# Patient Record
Sex: FEMALE | Race: OTHER | HISPANIC OR LATINO | URBAN - METROPOLITAN AREA
[De-identification: names, ages, dates, MRNs, and addresses within clinical notes are randomized per-mention and may not be internally consistent; named-entity substitution may affect disease eponyms.]

---

## 2022-03-16 ENCOUNTER — TELEPHONE (OUTPATIENT)
Dept: GASTROENTEROLOGY | Facility: CLINIC | Age: 62
End: 2022-03-16

## 2022-03-16 NOTE — TELEPHONE ENCOUNTER
----- Message from Kyra Escobar sent at 3/16/2022 12:03 PM CDT -----  Type:  Sooner Apoointment Request    Caller is requesting a sooner appointment.  Caller declined first available appointment listed below.  Caller will not accept being placed on the waitlist and is requesting a message be sent to doctor.  Name of Caller:Anjelica ZAPATA Festus  When is the first available appointment?5/4  Symptoms:rectal issues  Would the patient rather a call back or a response [facility]? call  Best Call Back Number:[phone]   Additional Information: n/a

## 2022-11-14 ENCOUNTER — TELEPHONE (OUTPATIENT)
Dept: GASTROENTEROLOGY | Facility: CLINIC | Age: 62
End: 2022-11-14
Payer: COMMERCIAL

## 2022-11-14 NOTE — TELEPHONE ENCOUNTER
Talked with patient; she had a bowel movement this am and had a red balloon shaped protrusion come out of her rectum. She states she has not had any bleeding or pain, but is having difficulty walking because of the size. She needs advise as to what to do.

## 2022-11-14 NOTE — TELEPHONE ENCOUNTER
----- Message from Laurita Rust sent at 11/14/2022 12:06 PM CST -----  Regarding: pt advice  Contact: pt  Type:  Needs Medical Advice    Who Called:  Anjelica Mortensen  Symptoms (please be specific):  red balloon shape protruding from anus    How long has patient had these symptoms:   today   Pharmacy name and phone #:  [facility] [address] [place]  [phone]  Would the patient rather a call back or a response via [facility]?  Call back   Best Call Back Number:  [phone]  Additional Information:

## 2022-11-15 NOTE — TELEPHONE ENCOUNTER
Add to Dr. Leon clinic schedule for tomorrow (Wednesday 11/16/2022) or Thursday 11/17/2022 afternoon.   MLC

## 2022-11-15 NOTE — TELEPHONE ENCOUNTER
Left VM message that Dr. Valverde would see her tomorrow or Thursday in clinic if she wishes. Instructed her to call us back and let us know what she wants to do.

## 2022-11-17 ENCOUNTER — TELEPHONE (OUTPATIENT)
Dept: GASTROENTEROLOGY | Facility: CLINIC | Age: 62
End: 2022-11-17
Payer: COMMERCIAL

## 2022-11-17 ENCOUNTER — TELEPHONE (OUTPATIENT)
Dept: SURGERY | Facility: CLINIC | Age: 62
End: 2022-11-17
Payer: COMMERCIAL

## 2022-11-17 NOTE — TELEPHONE ENCOUNTER
Spoke with patient regarding scheduling appointment with Dr. Portillo. Patient states that she feels that she may have rectal prolapse. Appointment scheduled and details confirmed verbally over phone. Reminder slip placed in mail. Patient verlbalizes understanding.

## 2022-11-17 NOTE — TELEPHONE ENCOUNTER
----- Message from Maggie Greenfield sent at 11/17/2022 10:00 AM CST -----  Contact: pt  Pt is trying to schedule an appt with provider 12/15 or 12/16 due to being in town already. She believes she may have rectal prolapse. She stated her tail bone also hurts often.     Confirmed contact below:  Contact Name:Anjelica Mortensen  Phone Number: [phone]

## 2022-11-17 NOTE — TELEPHONE ENCOUNTER
----- Message from Mani Cisse sent at 11/17/2022  9:18 AM CST -----  Contact: pt  .Type:  Patient Returning Call    Who Called:delio   Who Left Message for Patient:ankit  Does the patient know what this is regarding?:pt says she will not need appt with dr lira   Would the patient rather a call back or a response via [facility]?   Best Call Back Number:.[phone]    Additional Information:

## 2022-11-30 ENCOUNTER — TELEPHONE (OUTPATIENT)
Dept: SURGERY | Facility: CLINIC | Age: 62
End: 2022-11-30
Payer: COMMERCIAL

## 2022-12-01 ENCOUNTER — TELEPHONE (OUTPATIENT)
Dept: SURGERY | Facility: CLINIC | Age: 62
End: 2022-12-01
Payer: COMMERCIAL

## 2022-12-01 NOTE — TELEPHONE ENCOUNTER
"Spoke with patient regarding appointment on 12/16 that needs to be rescheduled. Pt stated, "she lives 3 hours away and was coming into town 12/15-12/16." Offered to see a different provider on 12/16. Pt declined, would like to be seen by Dr. Portillo. Inquired if patient would be able to come to clinic for 3:20 PM on 12/14. Pt verbally confirmed availability for appt.      ----- Message from Khalida José sent at 12/1/2022  4:28 PM CST -----  JEFF STROUD calling regarding Patient Advice for missed call from [place], call back [phone]    "

## 2022-12-02 ENCOUNTER — TELEPHONE (OUTPATIENT)
Dept: SURGERY | Facility: CLINIC | Age: 62
End: 2022-12-02
Payer: COMMERCIAL

## 2022-12-02 NOTE — TELEPHONE ENCOUNTER
Spoke with pt to inform her that Dr. Portillo's clinic will be available on 12/16 in the afternoon. Offered pt to keep 12/14 appt or move back to 12/16. Pt would like to move it back to 12/16. Will r/s at 2:20 PM on 12/16. Pt verbally confirmed appt time.

## 2022-12-15 NOTE — PROGRESS NOTES
CRS Office Visit History and Physical      SUBJECTIVE:     Chief Complaint: Rectal pressure    History of Present Illness:  The patient is new patient to this practice.   Course is as follows:  Patient is a 62 y.o. female presents with rectal pressure and difficulty having a complete BM.  Symptoms have been present for 2.5 years.  Has tried Pelvic Floor PT x 2 (June 2020 and summer 2022).  Did not notice significant improvement.  She also reports seeing a red bulge one time after a bowel movement, but only the one time and has not had to push anything back in.  Has seen a Gastro, tried Linzess (diarrhea and incontinence), MiraLax (stool too soft and frequent), and Metamucil (was told by her Gyn to stop).  Had a vaginal hysterectomy for similar symptoms in Sept 2021 (didn't help).  Has a daily BM in the morning.  Does not splint.  Feels like the only way she can easily empty is with using a glycerine suppository.    Prior colonoscopy: Yes - July 2021  Prior abdominal surgery: No  Prior pelvic or anorectal surgery: Yes - vag hyst  Family history of colon/rectal cancer: No  Family history of IBD: No  Steroid or other immunosuppression: No  Blood thinners: No  Current stool softeners or fiber supplement: No  Active smoking: No  Prior deliveries: Yes - 4  complicated by tear: Yes     [place] (FI):  Total: 0    Altomare (ODS):  How long on toilet:  >30min (4)  How many times/day:  3-4 (2)    Digitation:   2-3x/wk (3)   Laxatives:  2-3x/wk (3)   Enemas:    Never (0)   Incomplete stool:  Always (4)  Strain:    <75% (3)   Total: 19    Stool consistency/[place]: 2/3/4  Bowel movements per month:  Daily   Leakage of urine: No  Trouble emptying your bladder: No  Feel something bulging through vagina? Yes - unsure    Review of patient's allergies indicates:   Allergen Reactions    Azithromycin Other (See Comments)       No past medical history on file.  No past surgical history on file.  No family history on file.        Review  of Systems:  ROS    OBJECTIVE:     Vital Signs (Most Recent)  /70 (BP Location: Left arm, Patient Position: Sitting)   Pulse 63   Wt 64.5 kg (142 lb 4.8 oz)     Physical Exam:  General: Other female in no distress   Neuro: Alert and oriented x 4.  Moves all extremities.     HEENT: No icterus.  Trachea midline  Respiratory: Respirations are even and unlabored  Cardiac: Regular rate  Extremities: Warm dry and intact  Skin: No rashes    Examined in stirrups    Anorectal:   External exam: Perianal skin healthy, perineal body in tact, scar over posterior vaginal wall. No rectal prolapse with valsalva, no vaginal prolapse past the hymen with valsalva  Digital exam revealed decreased tone. Moderate squeeze with in tact external sphincter anteriorly, no large rectocele, delayed but appropriate relaxation with valsalva, no masses, no large enterocele    Patient was also taken to restroom and asked to sit on commode and strain.  No hemorrhoidal or rectal prolapse seen with this.    ASSESSMENT/PLAN:     63yo F w/ rectal pressure and incomplete evacuation    The patient was instructed to:  Increase water intake to at least 8-10 glasses of water per day.  Take a daily fiber supplement (Konsyl, Benefiber, Metamucil) and increase dietary intake to 20-30 grams/day.  Schedule fluoro defecography    Angie Portillo MD  Staff Surgeon, Colon and Rectal Surgery  [facility]

## 2022-12-16 ENCOUNTER — OFFICE VISIT (OUTPATIENT)
Dept: SURGERY | Facility: CLINIC | Age: 62
End: 2022-12-16
Attending: COLON & RECTAL SURGERY
Payer: COMMERCIAL

## 2022-12-16 VITALS — WEIGHT: 142.31 LBS | HEART RATE: 63 BPM | SYSTOLIC BLOOD PRESSURE: 118 MMHG | DIASTOLIC BLOOD PRESSURE: 70 MMHG

## 2022-12-16 DIAGNOSIS — R10.2 PELVIC PRESSURE IN FEMALE: Primary | ICD-10-CM

## 2022-12-16 PROCEDURE — 3078F DIAST BP <80 MM HG: CPT | Mod: CPTII,S$GLB,, | Performed by: COLON & RECTAL SURGERY

## 2022-12-16 PROCEDURE — 3074F PR MOST RECENT SYSTOLIC BLOOD PRESSURE < 130 MM HG: ICD-10-PCS | Mod: CPTII,S$GLB,, | Performed by: COLON & RECTAL SURGERY

## 2022-12-16 PROCEDURE — 99999 PR PBB SHADOW E&M-EST. PATIENT-LVL III: CPT | Mod: PBBFAC,,, | Performed by: COLON & RECTAL SURGERY

## 2022-12-16 PROCEDURE — 1159F PR MEDICATION LIST DOCUMENTED IN MEDICAL RECORD: ICD-10-PCS | Mod: CPTII,S$GLB,, | Performed by: COLON & RECTAL SURGERY

## 2022-12-16 PROCEDURE — 3074F SYST BP LT 130 MM HG: CPT | Mod: CPTII,S$GLB,, | Performed by: COLON & RECTAL SURGERY

## 2022-12-16 PROCEDURE — 99204 PR OFFICE/OUTPT VISIT, NEW, LEVL IV, 45-59 MIN: ICD-10-PCS | Mod: S$GLB,,, | Performed by: COLON & RECTAL SURGERY

## 2022-12-16 PROCEDURE — 3078F PR MOST RECENT DIASTOLIC BLOOD PRESSURE < 80 MM HG: ICD-10-PCS | Mod: CPTII,S$GLB,, | Performed by: COLON & RECTAL SURGERY

## 2022-12-16 PROCEDURE — 99999 PR PBB SHADOW E&M-EST. PATIENT-LVL III: ICD-10-PCS | Mod: PBBFAC,,, | Performed by: COLON & RECTAL SURGERY

## 2022-12-16 PROCEDURE — 1160F RVW MEDS BY RX/DR IN RCRD: CPT | Mod: CPTII,S$GLB,, | Performed by: COLON & RECTAL SURGERY

## 2022-12-16 PROCEDURE — 1160F PR REVIEW ALL MEDS BY PRESCRIBER/CLIN PHARMACIST DOCUMENTED: ICD-10-PCS | Mod: CPTII,S$GLB,, | Performed by: COLON & RECTAL SURGERY

## 2022-12-16 PROCEDURE — 99204 OFFICE O/P NEW MOD 45 MIN: CPT | Mod: S$GLB,,, | Performed by: COLON & RECTAL SURGERY

## 2022-12-16 PROCEDURE — 1159F MED LIST DOCD IN RCRD: CPT | Mod: CPTII,S$GLB,, | Performed by: COLON & RECTAL SURGERY

## 2022-12-16 NOTE — PATIENT INSTRUCTIONS
Start Benefiber   Stir 2 teaspoons of Benefiber into 4-8 oz of your favorite non-carbonated beverage or soft food (hot or cold). Stir well until dissolved. 2 teaspoons three times daily      What is a defecography?  Unlike a colonoscopy, you may not need to undergo a complete clean-out preparation. Some centers may simply ask you to use an enema before your appointment. You would then need to refrain from eating for at least two hours prior to the exam.    To perform the procedure, barium paste would be slowly injected into the rectum until full. Filling it to capacity stimulates the nerves to empty the bowel as it does under normal conditions.    You would then be asked to sit on a special toilet to evacuate the paste. You would be instructed to squeeze and strain as you expel the paste entirely or as much as you can, As this is happening, either a series of X-ray or an X-ray video will be taken.    The procedure takes approximately 15 to 30 minutes in total. While the exam may seem awkward and uncomfortable, it typically doesn't cause any pain.    In some cases, the doctor may request that you drink a barium solution an hour before the exam so that images of your small intestine can also be taken. In women, a small amount of barium paste may be smeared on the vagina to get a better image of the space between the vaginal wall and rectum.

## 2022-12-21 ENCOUNTER — TELEPHONE (OUTPATIENT)
Dept: SURGERY | Facility: CLINIC | Age: 62
End: 2022-12-21
Payer: COMMERCIAL

## 2022-12-21 NOTE — TELEPHONE ENCOUNTER
Request faxed over to [facility] for MRI images.     Phone [phone]  Fax [phone]

## 2022-12-28 ENCOUNTER — PATIENT MESSAGE (OUTPATIENT)
Dept: SURGERY | Facility: CLINIC | Age: 62
End: 2022-12-28
Payer: COMMERCIAL

## 2023-01-23 ENCOUNTER — TELEPHONE (OUTPATIENT)
Dept: SURGERY | Facility: CLINIC | Age: 63
End: 2023-01-23
Payer: COMMERCIAL

## 2023-01-23 NOTE — TELEPHONE ENCOUNTER
Talked to patient, she will be in town on 02/03 and would like to do defecogram then.     Called radiology- test scheduled & patient informed.     Also, asked her to please get MRI images from [facility] in [place] since we have been unsuccessful. Patient thinks she may have a copy with her that she could drop off next Friday.

## 2023-01-23 NOTE — TELEPHONE ENCOUNTER
----- Message from Angeles Geiger sent at 1/23/2023 11:47 AM CST -----  Contact: pt  Pt requesting call back RE: would like to schedule Fl Defecogram, nothing available in epic    Confirmed contact below:  Contact Name:Anjelica Dudleyowd  Phone Number: [phone]

## 2023-02-03 ENCOUNTER — HOSPITAL ENCOUNTER (OUTPATIENT)
Dept: RADIOLOGY | Facility: HOSPITAL | Age: 63
Discharge: HOME | End: 2023-02-03
Attending: COLON & RECTAL SURGERY
Payer: COMMERCIAL

## 2023-02-03 DIAGNOSIS — R10.2 PELVIC PRESSURE IN FEMALE: ICD-10-CM

## 2023-02-03 PROCEDURE — A9698 NON-RAD CONTRAST MATERIALNOC: HCPCS | Performed by: COLON & RECTAL SURGERY

## 2023-02-03 PROCEDURE — 25500020 PHARM REV CODE 255: Performed by: COLON & RECTAL SURGERY

## 2023-02-03 PROCEDURE — 74270 FL DEFECOGRAM: ICD-10-PCS | Mod: 26,,, | Performed by: RADIOLOGY

## 2023-02-03 PROCEDURE — 74270 X-RAY XM COLON 1CNTRST STD: CPT | Mod: 26,,, | Performed by: RADIOLOGY

## 2023-02-03 PROCEDURE — 74270 X-RAY XM COLON 1CNTRST STD: CPT | Mod: TC

## 2023-02-03 RX ADMIN — BARIUM SULFATE 684 G: 0.6 CREAM ORAL at 10:02

## 2023-02-03 RX ADMIN — BARIUM SULFATE 60 ML: 0.6 SUSPENSION ORAL at 10:02

## 2023-02-14 ENCOUNTER — TELEPHONE (OUTPATIENT)
Dept: SURGERY | Facility: CLINIC | Age: 63
End: 2023-02-14
Payer: COMMERCIAL

## 2023-02-14 NOTE — TELEPHONE ENCOUNTER
Called patient to provide her with our address so she can mail disc with MRI images. No answer, left a message.

## 2023-02-15 ENCOUNTER — TELEPHONE (OUTPATIENT)
Dept: SURGERY | Facility: CLINIC | Age: 63
End: 2023-02-15
Payer: COMMERCIAL

## 2023-02-15 NOTE — TELEPHONE ENCOUNTER
----- Message from Khalida José sent at 2/15/2023  2:53 PM CST -----  Anjelica Mortensen calling regarding Patient Advice for wanting to speak to the nurse Donnie for a missed call [phone]

## 2023-02-15 NOTE — TELEPHONE ENCOUNTER
Called patient back, provided clinic address so she can mail disc with MRI images. Will let her know when we receive it.

## 2024-10-30 ENCOUNTER — TELEPHONE (OUTPATIENT)
Dept: UROGYNECOLOGY | Facility: CLINIC | Age: 64
End: 2024-10-30
Payer: COMMERCIAL

## 2025-04-01 ENCOUNTER — TELEPHONE (OUTPATIENT)
Dept: SURGERY | Facility: CLINIC | Age: 65
End: 2025-04-01
Payer: COMMERCIAL

## 2025-04-01 NOTE — TELEPHONE ENCOUNTER
Placed call to screen pt for MDC candidacy.    Referring provider: Self referral [  Referral Dx: Rectal Prolapse   Additional Dx: Rectocele  Last seen bu Dr. Portillo in 12/22 for pelvic pain/ possible prolapse  Previously scheduled to see Dr. Francois [pt cancelled both appts]    LVM w/ reason for calling, clinic number w/ request for callback to perform screening.

## 2025-04-08 ENCOUNTER — TELEPHONE (OUTPATIENT)
Dept: SURGERY | Facility: CLINIC | Age: 65
End: 2025-04-08
Payer: COMMERCIAL

## 2025-04-08 NOTE — TELEPHONE ENCOUNTER
"Placed call to screen pt for MDC candidacy.    Referring provider: Self  CRS Appointment Dx: Rectal Prolapse  Additional Dx: Rectocele (?)    Patient complains of pelvic pressure and sensation of incomplete emptying.    1)  Urinary incontinence  --Do you have stress urinary leakage (leaking with cough, sneeze, exercise, physical activity, etc)?   occasionally    --Do you have urge urinary leakage (run to the bathroom but can't get there in time)?  Sometimes, but not a problem     --Which is worse? Equal  --Do you get frequent bladder infections/UTIs? (>3 in 1 year or 2 in 6 months per documented, positive urine culture): no   --When urinating, do you feel like you empty your bladder completely? yes     2)  Pelvic organ prolapse  --Do you notice something bulging from your vagina? unknown   --If so, how far does it come out? ---   --Do you have symptoms from the bulge? unknown   --If so, what are those symptoms? ---   --Are you having any bothersome vaginal discharge? no     --Do you notice something bulging from your rectum? yes - "1 time" Possibly following a bowel movement. Endorses increased pelvic pressure even after BM   --If so, can it be reduced? Yes  -- When does the rectal prolapse occur? Unsure, doesn't feel bulging tissue without having to look    3)  Bowel habits  -- Do you experience irregular bowel habits? Denies irregular bowel habits, just feeling of incomplete evacuation      Constipation: Denies  --Are you often constipated or have trouble passing stool? no   --Do you often have to strain with your bowel movements? no   --Is your stool consistency often hard/pellet-like? no   --When you have a bowel movement, do you feel like you empty completely? no    If not:                 - Do you ever have to push into the vaginal canal to fully empty? yes                   - Do you every have to push around the vagina/anus area to help evacuation? yes   --Do you have tissue bulging from the anus that stays " out? unknown     Diarrhea/Fecal Incontinence- Denies    -- Endorses daily Bms without the need to strain  -- Eats a healthy diet and exercises regularly      *Only ask if applicable*    4) Rectovaginal Fistulas  -- Do you ever experience the loss of stool or pass gas through the vaginal canal? no     5) Do you have a history of obstetrical tears or episiotomies during a vaginal delivery? Yes  -- If so, how many vaginal births? 4  -- Explain the degree of tear or episiotomy details. Experienced a tear with 2nd child        Patient identifies incomplete evacuation & pelvic pressure  as most bothersome.      Patient deemed not to be a candidate for MDC. Discussed with patient the benefit to scheduling appt w/ UroGyn provider based on s/s of rectocele based on her feedback.   Ms. Mortensen provided verbal approval to cancel CRS appt and requested to be scheduled w/ Dr. Francois in UroGyn.   RN to msg Dr. Francois's staff to assist w/ appt scheduling.     No additional needs identified upon completion of screening call.

## 2025-06-26 ENCOUNTER — OFFICE VISIT (OUTPATIENT)
Dept: UROGYNECOLOGY | Facility: CLINIC | Age: 65
End: 2025-06-26
Payer: COMMERCIAL

## 2025-06-26 ENCOUNTER — TELEPHONE (OUTPATIENT)
Dept: UROGYNECOLOGY | Facility: CLINIC | Age: 65
End: 2025-06-26
Payer: COMMERCIAL

## 2025-06-26 VITALS
HEART RATE: 70 BPM | BODY MASS INDEX: 23.69 KG/M2 | HEIGHT: 65 IN | DIASTOLIC BLOOD PRESSURE: 62 MMHG | SYSTOLIC BLOOD PRESSURE: 145 MMHG | WEIGHT: 142.19 LBS

## 2025-06-26 DIAGNOSIS — M79.18 MYOFASCIAL PAIN: ICD-10-CM

## 2025-06-26 DIAGNOSIS — M62.89 HIGH-TONE PELVIC FLOOR DYSFUNCTION: Primary | ICD-10-CM

## 2025-06-26 PROCEDURE — 99999 PR PBB SHADOW E&M-EST. PATIENT-LVL III: CPT | Mod: PBBFAC,,, | Performed by: OBSTETRICS & GYNECOLOGY

## 2025-06-26 RX ORDER — VALACYCLOVIR HYDROCHLORIDE 500 MG/1
500 TABLET, FILM COATED ORAL EVERY 12 HOURS
COMMUNITY
Start: 2025-01-10

## 2025-06-26 RX ORDER — ESTRADIOL 0.05 MG/D
1 PATCH TRANSDERMAL
COMMUNITY
Start: 2021-11-18

## 2025-06-26 NOTE — PROGRESS NOTES
Chief Complaint   Patient presents with    Vaginal Prolapse     Urinary incontinence        HPI: Patient is a 64 y.o. female  who presents today with concerns of rectal discomfort. States that symptoms started in 2020 when she began having pain/ discomfort in the tailbone area. Discomfort with sitting and the noticed her bowel movements changed. States several things have been done but there is still something wrong. She feels that she is not evacuating stool well. Symptoms are worse in the evening. Uses glycerin suppository which helps her empty. States she has a lot of discomfort. Mostly bothered by the poor or incomplete emptying.     In 2020 she had an X-ray and an MRI and was diagnosed with bilateral sacroilitis. May 2002 she had testing performed/ blood work to r/o arthritis/ cancer. Was seen at the [facility], Dr. Kai Sarmiento diagnosed her with coccydynia and referred her to PT with a pelvic specialist. In 2020 had a pelvic ultrasound and was diagnosed with pelvic congestion. Started PT with [facility] and did 6 visits from  through October. Had a sigmoidoscopy with Dr. Scot Valverde and underwent a nerve block injections at [facility]. Did not find it helpful. Had a CT scan in 2021 which was unremarkable. Ruled out ankylosing spondylolisthesis.She had dry needling 2021 for 4 sessions. In 2021 had a negative diagnostic laparoscopy. In 2021 had a steroid injection L4-L5 at the [facility] which did not help. Underwent the TLH/ BSO in 2021. In 2021 had a nuclear medicine CT scan and an MRI. Steroid injections were repeated 2022 and 2022. No change in symptoms. Did pelvic PT again at [facility] for 8 visits -2022. Saw Dr. Portillo and underwent a defecography with Dr. Portillo in 2023. Results of the defecography indicated mild residual contrast after evacuation otherwise was unremarkable with anorectal  angles within normal limits.     Started pilates 2/1/25 and she states that she feels so much better. She does ride a bike.     She denies constipation. States that she has regular bowel movements and they are soft but formed in consistency.     She denies a vaginal bulge but feels that something may be bulging from the rectal region. Thinks may have a hemorrhoid. At the end of the day she has to have a bowel movements and then uses a suppository.     Patient is sexual activity. She does endorse at times pain with intercourse.     She may occasionally have urgency incontinence if she waits too long. If she laughs too hard she may leak urine. But none of these occur on a regular basis.   She denies difficulty emptying her bladder. Denies issues of incomplete emptying. She reports that her urine stream is continuous and strong.   She denies needing to splint to urinate or have bowel movements.     She is drinking multiple (3) 16 ounces cups of water. May drink tea or milk.     Review of Systems   Constitutional:  Negative for activity change, appetite change, chills, fatigue, fever and unexpected weight change.   HENT:  Negative for nasal congestion, dental problem, hearing loss, mouth dryness and sore throat.    Eyes:  Negative for pain and eye dryness.   Respiratory:  Negative for cough, shortness of breath and wheezing.    Cardiovascular:  Negative for chest pain, palpitations and leg swelling.   Gastrointestinal:  Negative for abdominal distention, abdominal pain, blood in stool, constipation and rectal pain.   Endocrine: Negative for cold intolerance and heat intolerance.   Genitourinary:  Negative for dyspareunia, hot flashes and vaginal dryness.   Musculoskeletal:  Negative for arthralgias, back pain, gait problem, joint swelling, myalgias, neck pain and neck stiffness.   Integumentary:  Negative for rash.   Neurological:  Negative for dizziness, tremors, seizures, speech difficulty, weakness, light-headedness,  numbness and headaches.   Psychiatric/Behavioral:  Negative for confusion, dysphoric mood and sleep disturbance. The patient is not nervous/anxious.         Past Medical History:   Diagnosis Date    H/O total vaginal hysterectomy        Past Surgical History:   Procedure Laterality Date    HYSTERECTOMY, TOTAL, LAPAROSCOPIC, WITH SALPINGO-OOPHORECTOMY Bilateral 2021    NASAL SEPTUM SURGERY         Current Medications[1]    Review of patient's allergies indicates:   Allergen Reactions    Azithromycin Other (See Comments)    Erythromycin base        Family History   Problem Relation Name Age of Onset    Hypertension Mother 94     Stroke Mother 94     Thyroid disease Mother 94     No Known Problems Father 95     Breast cancer Sister         Social History     Socioeconomic History    Marital status: Unknown   Tobacco Use    Smoking status: Never    Smokeless tobacco: Never    Tobacco comments:     college   Substance and Sexual Activity    Alcohol use: Yes     Comment: occ    Drug use: Yes     Types: Marijuana    Sexual activity: Yes     Partners: Male   Social History Narrative    ** Merged History Encounter **     Lives with spouse of 36 years. Feels safe in her home.        OB History          4    Para   4    Term   4            AB        Living   4         SAB        IAB        Ectopic        Multiple        Live Births               Obstetric Comments   Smallest baby 7lbs 11oz rest were 8-9 pounds                Gyn History    Mammogram: none on file to review  Pap smear: s/p hysterectomy  LMP: No LMP recorded. Patient is postmenopausal.   Postmenopausal bleeding: n/a      INITIAL PHYSICAL EXAMINATION    Vitals:    25 1302   BP: (!) 145/62   Pulse: 70      General: Healthy in appearance, Well nourished, Affect Normal, NAD.  Neck: Supple, No masses, Trachea midline, Thyroid normal size,  non-tender, no masses or nodules.  Nodes: No clavicular/cervical adenopathy.  Skin: Normal  temperature, No atypical lesions or rash.  Heart: Normal sounds, no murmurs  Lungs: Normal respiratory effort.  Gastrointestinal: Non tender, Non distended, No masses, guarding or  rebound, No hepatosplenomegally, No hernia.  Ext: No clubbing, cyanosis, edema or varicosities.  DTR's: 2+ bilaterally  Strength 5/5 bilateral upper and lower extremities    Genitourinary- (Verbal consent obtained; Female chaperone present during the pelvic exam)    Vulva: normal without lesions, masses, atrophy or pain  Urethra: meatus central and normal in appearance, no prolapse/caruncle, no masses or discharge. Empty supine stress test was negative.  Bladder: non-tender, no masses  Vagina: No discharge or lesions, moderate atrophy, no masses appreciated.  [See POP-Q]  Cervix: absent  Uterus:  absent  Adnexa: no masses, non tender.  Rectal: deferred   Neuro: S2-4- pin prick and light touch intact, Anal wink present  Levator strength: 2/5  Levator tenderness: left 5/10 and right 5/10    POP-Q Exam- pelvic organ prolapse quantitative    Aa -2  Anterior Wall Ba -2  Anterior wall C -9  Cervix or cuff   Gh 3.5  Genital hiatus pb 3  perineal body tvl 9.5  Total vaginal length   Ap -2.5  Posterior wall Bp -2.5  Posterior wall D   Posterior fornix     without Uterus    POP-Q Stage:1    No visits with results within 1 Day(s) from this visit.   Latest known visit with results is:   No results found for any previous visit.        ASSESSMENT & PLAN:    High-tone pelvic floor dysfunction  -     Ambulatory Referral/Consult to Physical Therapy; Future; Expected date: 07/03/2025  -     LIDOCAINE 2 %, VALIUM 5 MG, BACLOFEN 4 % SUPPOSITORY; Place 1 suppository vaginally once daily.  Dispense: 30 each; Refill: 2    Myofascial pain  -     Ambulatory Referral/Consult to Physical Therapy; Future; Expected date: 07/03/2025  -     LIDOCAINE 2 %, VALIUM 5 MG, BACLOFEN 4 % SUPPOSITORY; Place 1 suppository vaginally once daily.  Dispense: 30 each; Refill: 2        Exam findings and treatment options were discussed in detail with the patient. The various etiologies of her pelvic/lower urinary tract symptoms were discussed and a presumptive diagnosis as above reviewed. I have recommended she re-try pelvic floor PT but with the focus being on myofascial release. Discussed the option of a [place] as well so that she may be able to treat herself in between sessions at home. Additionally we discussed trying intravaginal valium, baclofen, and lidocaine suppositories nightly. If symptoms do not improve we discussed considering a pudendal nerve block and trigger point injections.     Patient was provided the UptoDate information about myofasical pain of the pelvic floor clinical manifestations and symptoms and treatments to review.     She was regerred to Griselda Escalante DPT in [place] as she has already seen and worked with [facility] and reports that she personally knows Mirian Boltonimes who recently left [facility].     I have instructed her to follow up in about 4 months at which time her symptoms will be reevaluated. Further options will be considered if she is not significantly improved at that time.    All questions were answered today. The patient was encouraged to contact the office as needed with any additional questions or concerns.     Total time spent on visit was 64 minutes.  This includes face to face time and non-face to face time preparing to see the patient (eg, review of tests), Obtaining and/or reviewing separately obtained history, Documenting clinical information in the electronic or other health record, Independently interpreting resultsand communicating results to the patient/family/caregiver, or Care coordination.    Barbara Francois MD             [1]   Current Outpatient Medications:     estradiol 0.05 mg/24 hr td ptwk 0.05 mg/24 hr PTWK, Place 1 patch onto the skin., Disp: , Rfl:     valACYclovir (VALTREX) 500 MG tablet, Take 500 mg by mouth  every 12 (twelve) hours., Disp: , Rfl:     LIDOCAINE 2 %, VALIUM 5 MG, BACLOFEN 4 % SUPPOSITORY, Place 1 suppository vaginally once daily., Disp: 30 each, Rfl: 2